# Patient Record
Sex: MALE | Race: WHITE | NOT HISPANIC OR LATINO | Employment: FULL TIME | URBAN - METROPOLITAN AREA
[De-identification: names, ages, dates, MRNs, and addresses within clinical notes are randomized per-mention and may not be internally consistent; named-entity substitution may affect disease eponyms.]

---

## 2021-04-08 DIAGNOSIS — Z23 ENCOUNTER FOR IMMUNIZATION: ICD-10-CM

## 2021-04-14 ENCOUNTER — IMMUNIZATIONS (OUTPATIENT)
Dept: FAMILY MEDICINE CLINIC | Facility: HOSPITAL | Age: 51
End: 2021-04-14

## 2021-04-14 DIAGNOSIS — Z23 ENCOUNTER FOR IMMUNIZATION: Primary | ICD-10-CM

## 2021-04-14 PROCEDURE — 91300 SARS-COV-2 / COVID-19 MRNA VACCINE (PFIZER-BIONTECH) 30 MCG: CPT

## 2021-04-14 PROCEDURE — 0001A SARS-COV-2 / COVID-19 MRNA VACCINE (PFIZER-BIONTECH) 30 MCG: CPT

## 2021-05-07 ENCOUNTER — IMMUNIZATIONS (OUTPATIENT)
Dept: FAMILY MEDICINE CLINIC | Facility: HOSPITAL | Age: 51
End: 2021-05-07

## 2021-05-07 DIAGNOSIS — Z23 ENCOUNTER FOR IMMUNIZATION: Primary | ICD-10-CM

## 2021-05-07 PROCEDURE — 0002A SARS-COV-2 / COVID-19 MRNA VACCINE (PFIZER-BIONTECH) 30 MCG: CPT

## 2021-05-07 PROCEDURE — 91300 SARS-COV-2 / COVID-19 MRNA VACCINE (PFIZER-BIONTECH) 30 MCG: CPT

## 2022-02-24 ENCOUNTER — APPOINTMENT (OUTPATIENT)
Dept: RADIOLOGY | Facility: CLINIC | Age: 52
End: 2022-02-24
Payer: COMMERCIAL

## 2022-02-24 ENCOUNTER — OFFICE VISIT (OUTPATIENT)
Dept: URGENT CARE | Facility: CLINIC | Age: 52
End: 2022-02-24
Payer: COMMERCIAL

## 2022-02-24 VITALS — HEART RATE: 94 BPM | RESPIRATION RATE: 14 BRPM | OXYGEN SATURATION: 100 % | TEMPERATURE: 97.3 F

## 2022-02-24 DIAGNOSIS — S89.92XA INJURY, KNEE, LEFT, INITIAL ENCOUNTER: ICD-10-CM

## 2022-02-24 DIAGNOSIS — S89.92XA INJURY, KNEE, LEFT, INITIAL ENCOUNTER: Primary | ICD-10-CM

## 2022-02-24 PROCEDURE — 73564 X-RAY EXAM KNEE 4 OR MORE: CPT

## 2022-02-24 PROCEDURE — 99214 OFFICE O/P EST MOD 30 MIN: CPT | Performed by: PHYSICIAN ASSISTANT

## 2022-02-24 RX ORDER — PREDNISONE 10 MG/1
40 TABLET ORAL DAILY
Qty: 20 TABLET | Refills: 0 | Status: SHIPPED | OUTPATIENT
Start: 2022-02-24 | End: 2022-03-01

## 2022-02-24 RX ORDER — NAPROXEN 500 MG/1
500 TABLET ORAL 2 TIMES DAILY WITH MEALS
Qty: 60 TABLET | Refills: 0 | Status: SHIPPED | OUTPATIENT
Start: 2022-02-24 | End: 2022-03-26

## 2022-02-24 NOTE — LETTER
February 24, 2022     Patient: Jaquelin Irizarry   YOB: 1970   Date of Visit: 2/24/2022       To Whom it May Concern:    Jaquelin Irizarry is under my professional care  He was seen in my office on 2/24/2022  He may return to work on 2/28/22  If you have any questions or concerns, please don't hesitate to call           Sincerely,          Luis Black PA-C        CC: No Recipients

## 2022-02-24 NOTE — PROGRESS NOTES
[address] [facility] [facility]        NAME: Lola Vera is a 46 y o  male  : 1970    MRN: [id number]  DATE: 2022  TIME: 1:40 PM    Assessment and Plan   Injury, knee, left, initial encounter [S89 [id number]]  1  Injury, knee, left, initial encounter  XR knee 4+ vw left injury    predniSONE 10 mg tablet    Diclofenac Sodium (VOLTAREN) 1 %    naproxen (EC NAPROSYN) 500 MG EC tablet    Ambulatory referral to Orthopedic Surgery         Patient Instructions   Patient Instructions     Take the steroid and apply the Volatren Gel -  You may get this over the counter if it is cheaper   After the steroid you may take naproxen twice a day  For now use tylenol for pain  Keep it elevated, ice it, keep it compressed and keep off of it     If the swelling does not go down in 24-48 hours follow-up with orthopedics  If you develop a fever or your joint turns red go to the emergency room  Possible bursitis or joint effusion   Lower on the list of possibilities if a recurrence of your gout       Knee Bursitis   AMBULATORY CARE:   Knee bursitis  is inflammation of the bursa in your knee  The bursa is a fluid-filled sac that acts as a cushion between a bone and a tendon  A tendon is a cord of strong tissue that connects muscles to bones  Common signs and symptoms of knee bursitis:   · Pain, swelling, or tenderness in your knee    · Decreased movement or stiffness of your knee    · Red, warm, skin over your knee    · A grating or grinding sound or feeling when you move your knee    Call your doctor if:   · Your pain and swelling increase  · Your symptoms do not improve with treatment  · You have a fever  · You have questions or concerns about your condition or care  Treatment  may include any of the following:  · Medicines:      ? NSAIDs , such as ibuprofen, help decrease swelling, pain, and fever  NSAIDs can cause stomach bleeding or kidney problems in certain people   If you take blood thinner medicine, always ask your healthcare provider if NSAIDs are safe for you  Always read the medicine label and follow directions  ? Aspirin  helps relieve pain and swelling  Take aspirin exactly as directed by your healthcare provider  ? Antibiotics  help fight an infection caused by bacteria  ? Steroids  help relieve pain and swelling  Steroid injections are given directly into the painful area  Steroid pills may be given for a short time  · Surgery  may be used to remove your bursa  Surgery is only done when other treatments do not work  Manage your symptoms:   · Rest your knee as much as possible to decrease pain and swelling  Slowly start to do more each day  Return to your daily activities as directed  · Apply ice to help decrease swelling and pain  Ice may also help prevent tissue damage  Use an ice pack, or put crushed ice in a plastic bag  Cover it with a towel and place it on your knee for 15 to 20 minutes, 3 to 4 times each day, as directed  · Apply heat to help decrease pain and stiffness  Apply heat on the area for 15 to 20 minutes, 3 to 4 times each day, as directed  · Apply compression to decrease swelling  Healthcare providers may wrap your knee with tape or an elastic bandage to decrease swelling  Loosen the elastic bandage if you start to lose feeling in your toes  · Elevate  your knee above the level of your heart as often as you can  This will help decrease swelling and pain  Prop your lower leg on pillows or blankets to keep it elevated comfortably  Do not put the pillow directly under your knee  · Go to physical therapy, if directed  A physical therapist can teach you exercises to improve your range of motion and increase knee strength  Prevent knee bursitis:   · Stretch, warm up, and cool down when you exercise  This will help loosen your muscles and decrease stress on your knees  Rest between workouts  · Protect your knees    Use kneepads when you kneel on a hard surface and when you play sports  Stand and walk around every 20 minutes if you have to kneel for a long period of time  Follow up with your doctor as directed:  Write down your questions so you remember to ask them during your visits  © Copyright [employer] 2021 Information is for End User's use only and may not be sold, redistributed or otherwise used for commercial purposes  All illustrations and images included in CareNotes® are the copyrighted property of [employer] D A M [employer] Inc  or [facility] [employer]   The above information is an  only  It is not intended as medical advice for individual conditions or treatments  Talk to your doctor, nurse or pharmacist before following any medical regimen to see if it is safe and effective for you  Follow up with PCP in 3-5 days  Proceed to  ER if symptoms worsen  Chief Complaint     Chief Complaint   Patient presents with    Injury     pt presents with injury of the left knee 2-3 days ago          History of Present Illness       The patient is a 49-year-old male presenting with left knee pain x3 days  He denies any injury  He now is unable to walk or bend the knee  He believes he jammed the knee while at work  The patient does have a history of gout but this does not feel like prior episodes  He has not recently been eating red meats or drinking alcohol  He reports noticing slight pain 3 this that swelling developed  He is having pain with walking  Review of Systems   Review of Systems   Constitutional: Negative for chills and fever  HENT: Negative for ear pain and sore throat  Eyes: Negative for pain and visual disturbance  Respiratory: Negative for cough and shortness of breath  Cardiovascular: Negative for chest pain and palpitations  Gastrointestinal: Negative for abdominal pain and vomiting  Genitourinary: Negative for dysuria and hematuria  Musculoskeletal: Positive for arthralgias and joint swelling  Negative for back pain  Skin: Negative for color change and rash  Neurological: Negative for seizures and syncope  All other systems reviewed and are negative  Current Medications       Current Outpatient Medications:     Diclofenac Sodium (VOLTAREN) 1 %, Apply 2 g topically 4 (four) times a day, Disp: 120 g, Rfl: 0    naproxen (EC NAPROSYN) 500 MG EC tablet, Take 1 tablet (500 mg total) by mouth 2 (two) times a day with meals, Disp: 60 tablet, Rfl: 0    predniSONE 10 mg tablet, Take 4 tablets (40 mg total) by mouth daily for 5 days, Disp: 20 tablet, Rfl: 0    Current Allergies     Allergies as of 02/24/2022    (No Known Allergies)            The following portions of the patient's history were reviewed and updated as appropriate: allergies, current medications, past family history, past medical history, past social history, past surgical history and problem list      Past Medical History:   Diagnosis Date    Patient denies medical problems        Past Surgical History:   Procedure Laterality Date    EYE SURGERY         History reviewed  No pertinent family history  Medications have been verified  Objective   Pulse 94   Temp (!) 97 3 °F (36 3 °C)   Resp 14   SpO2 100%        Physical Exam     Physical Exam  Vitals and nursing note reviewed  Constitutional:       General: He is not in acute distress  Appearance: Normal appearance  He is normal weight  He is not ill-appearing, toxic-appearing or diaphoretic  HENT:      Head: Normocephalic and atraumatic  Cardiovascular:      Rate and Rhythm: Normal rate and regular rhythm  Heart sounds: Normal heart sounds  No murmur heard  No friction rub  No gallop  Pulmonary:      Effort: Pulmonary effort is normal  No respiratory distress  Breath sounds: Normal breath sounds  No stridor  No wheezing, rhonchi or rales  Chest:      Chest wall: No tenderness  Abdominal:      General: Abdomen is flat   Bowel sounds are normal  There is no distension  Palpations: Abdomen is soft  Tenderness: There is no abdominal tenderness  There is no guarding  Musculoskeletal:         General: Swelling and tenderness present  No deformity or signs of injury  Normal range of motion  Cervical back: Normal range of motion  Comments: Mild tenderness to deep palpation of the left knee  The left knee is swollen above the patella  There appears to be a joint effusion  The patient does have full range of motion  The joint is slightly warm to the touch  Lymphadenopathy:      Cervical: No cervical adenopathy  Skin:     General: Skin is warm and dry  Capillary Refill: Capillary refill takes less than 2 seconds  Neurological:      Mental Status: He is alert

## 2022-02-24 NOTE — PATIENT INSTRUCTIONS
Take the steroid and apply the Volatren Gel -  You may get this over the counter if it is cheaper   After the steroid you may take naproxen twice a day  For now use tylenol for pain  Keep it elevated, ice it, keep it compressed and keep off of it     If the swelling does not go down in 24-48 hours follow-up with orthopedics  If you develop a fever or your joint turns red go to the emergency room  Possible bursitis or joint effusion   Lower on the list of possibilities if a recurrence of your gout       Knee Bursitis   AMBULATORY CARE:   Knee bursitis  is inflammation of the bursa in your knee  The bursa is a fluid-filled sac that acts as a cushion between a bone and a tendon  A tendon is a cord of strong tissue that connects muscles to bones  Common signs and symptoms of knee bursitis:   · Pain, swelling, or tenderness in your knee    · Decreased movement or stiffness of your knee    · Red, warm, skin over your knee    · A grating or grinding sound or feeling when you move your knee    Call your doctor if:   · Your pain and swelling increase  · Your symptoms do not improve with treatment  · You have a fever  · You have questions or concerns about your condition or care  Treatment  may include any of the following:  · Medicines:      ? NSAIDs , such as ibuprofen, help decrease swelling, pain, and fever  NSAIDs can cause stomach bleeding or kidney problems in certain people  If you take blood thinner medicine, always ask your healthcare provider if NSAIDs are safe for you  Always read the medicine label and follow directions  ? Aspirin  helps relieve pain and swelling  Take aspirin exactly as directed by your healthcare provider  ? Antibiotics  help fight an infection caused by bacteria  ? Steroids  help relieve pain and swelling  Steroid injections are given directly into the painful area  Steroid pills may be given for a short time  · Surgery  may be used to remove your bursa  Surgery is only done when other treatments do not work  Manage your symptoms:   · Rest your knee as much as possible to decrease pain and swelling  Slowly start to do more each day  Return to your daily activities as directed  · Apply ice to help decrease swelling and pain  Ice may also help prevent tissue damage  Use an ice pack, or put crushed ice in a plastic bag  Cover it with a towel and place it on your knee for 15 to 20 minutes, 3 to 4 times each day, as directed  · Apply heat to help decrease pain and stiffness  Apply heat on the area for 15 to 20 minutes, 3 to 4 times each day, as directed  · Apply compression to decrease swelling  Healthcare providers may wrap your knee with tape or an elastic bandage to decrease swelling  Loosen the elastic bandage if you start to lose feeling in your toes  · Elevate  your knee above the level of your heart as often as you can  This will help decrease swelling and pain  Prop your lower leg on pillows or blankets to keep it elevated comfortably  Do not put the pillow directly under your knee  · Go to physical therapy, if directed  A physical therapist can teach you exercises to improve your range of motion and increase knee strength  Prevent knee bursitis:   · Stretch, warm up, and cool down when you exercise  This will help loosen your muscles and decrease stress on your knees  Rest between workouts  · Protect your knees  Use kneepads when you kneel on a hard surface and when you play sports  Stand and walk around every 20 minutes if you have to kneel for a long period of time  Follow up with your doctor as directed:  Write down your questions so you remember to ask them during your visits  © Copyright [employer] 2021 Information is for End User's use only and may not be sold, redistributed or otherwise used for commercial purposes   All illustrations and images included in CareNotes® are the copyrighted property of [employer]  or [facility]  The above information is an  only  It is not intended as medical advice for individual conditions or treatments  Talk to your doctor, nurse or pharmacist before following any medical regimen to see if it is safe and effective for you

## 2023-03-23 ENCOUNTER — HOSPITAL ENCOUNTER (EMERGENCY)
Facility: HOSPITAL | Age: 53
Discharge: HOME | End: 2023-03-23
Attending: EMERGENCY MEDICINE

## 2023-03-23 ENCOUNTER — APPOINTMENT (EMERGENCY)
Dept: RADIOLOGY | Facility: HOSPITAL | Age: 53
End: 2023-03-23

## 2023-03-23 ENCOUNTER — OFFICE VISIT (OUTPATIENT)
Dept: URGENT CARE | Facility: CLINIC | Age: 53
End: 2023-03-23

## 2023-03-23 VITALS
TEMPERATURE: 97.8 F | WEIGHT: 210 LBS | SYSTOLIC BLOOD PRESSURE: 191 MMHG | RESPIRATION RATE: 18 BRPM | BODY MASS INDEX: 27.71 KG/M2 | DIASTOLIC BLOOD PRESSURE: 93 MMHG | HEART RATE: 79 BPM | OXYGEN SATURATION: 95 %

## 2023-03-23 VITALS
RESPIRATION RATE: 18 BRPM | WEIGHT: 210 LBS | HEIGHT: 73 IN | HEART RATE: 76 BPM | OXYGEN SATURATION: 98 % | BODY MASS INDEX: 27.83 KG/M2 | TEMPERATURE: 97.1 F

## 2023-03-23 DIAGNOSIS — M25.561 PAIN AND SWELLING OF RIGHT KNEE: Primary | ICD-10-CM

## 2023-03-23 DIAGNOSIS — M79.661 RIGHT CALF PAIN: Primary | ICD-10-CM

## 2023-03-23 DIAGNOSIS — M25.461 PAIN AND SWELLING OF RIGHT KNEE: Primary | ICD-10-CM

## 2023-03-23 LAB
APPEARANCE FLD: CLEAR
COLOR FLD: ABNORMAL
CRYSTALS SNV QL MICRO: NORMAL
MONOCYTES NFR SNV MANUAL: 19 %
NEUTROPHILS NFR SNV MANUAL: 81 %
RBC # SNV MANUAL: 0 /UL (ref 0–10)
SITE: ABNORMAL
TOTAL CELLS COUNTED SPEC: 100
WBC # FLD MANUAL: 949 /UL (ref 0–200)

## 2023-03-23 RX ORDER — IBUPROFEN 400 MG/1
400 TABLET ORAL ONCE
Status: COMPLETED | OUTPATIENT
Start: 2023-03-23 | End: 2023-03-23

## 2023-03-23 RX ORDER — NAPROXEN 500 MG/1
500 TABLET ORAL 2 TIMES DAILY WITH MEALS
Qty: 14 TABLET | Refills: 0 | Status: SHIPPED | OUTPATIENT
Start: 2023-03-23 | End: 2023-03-30

## 2023-03-23 RX ORDER — GINSENG 100 MG
1 CAPSULE ORAL ONCE
Status: COMPLETED | OUTPATIENT
Start: 2023-03-23 | End: 2023-03-23

## 2023-03-23 RX ORDER — LIDOCAINE HYDROCHLORIDE AND EPINEPHRINE 10; 10 MG/ML; UG/ML
10 INJECTION, SOLUTION INFILTRATION; PERINEURAL ONCE
Status: COMPLETED | OUTPATIENT
Start: 2023-03-23 | End: 2023-03-23

## 2023-03-23 RX ADMIN — LIDOCAINE HYDROCHLORIDE,EPINEPHRINE BITARTRATE 10 ML: 10; .01 INJECTION, SOLUTION INFILTRATION; PERINEURAL at 14:11

## 2023-03-23 RX ADMIN — BACITRACIN 1 SMALL APPLICATION: 500 OINTMENT TOPICAL at 14:11

## 2023-03-23 RX ADMIN — IBUPROFEN 400 MG: 400 TABLET ORAL at 13:21

## 2023-03-23 NOTE — PROGRESS NOTES
[address] [facility]        NAME: Sebastian Carreon is a 46 y o  male  : 1970    MRN: [id number]  DATE: 2023  TIME: 12:34 PM    Assessment and Plan   Right calf pain [M79 [id number]]  1  Right calf pain  Transfer to other facility        Concern for ruptured Baker's cyst but cannot ro DVT  Recommend ED for Doppler  Pt agreeable and will drive self to [facility] now  Patient Instructions       Follow up with PCP in 3-5 days  Proceed to  ER if symptoms worsen  Chief Complaint     Chief Complaint   Patient presents with   • Knee Pain     Rt knee pain without injury had swollen knee with pain posteriorly          History of Present Illness       Pt is a 45 yo male with no reported pmh pw atraumatic R lower leg pain x 1 day  Hurts to bend it at all and significantly more swollen than left leg  Is a  and heavy smoker  Denies personal or family history of blood clots  No sob, chest pain  Review of Systems   Review of Systems   Constitutional: Negative for chills and fever  Respiratory: Negative for shortness of breath  Cardiovascular: Positive for leg swelling  Negative for chest pain  Gastrointestinal: Negative for nausea and vomiting  Musculoskeletal: Negative for back pain, gait problem, joint swelling, myalgias and neck pain  Skin: Negative for color change and wound  Neurological: Negative for weakness and numbness           Current Medications       Current Outpatient Medications:   •  Diclofenac Sodium (VOLTAREN) 1 %, Apply 2 g topically 4 (four) times a day (Patient not taking: Reported on 3/23/2023), Disp: 120 g, Rfl: 0  •  naproxen (EC NAPROSYN) 500 MG EC tablet, Take 1 tablet (500 mg total) by mouth 2 (two) times a day with meals, Disp: 60 tablet, Rfl: 0    Current Allergies     Allergies as of 2023   • (No Known Allergies)            The following portions of the patient's history were reviewed and updated as appropriate: allergies, current medications, past family history, past medical history, past social history, past surgical history and problem list      Past Medical History:   Diagnosis Date   • Patient denies medical problems        Past Surgical History:   Procedure Laterality Date   • EYE SURGERY         No family history on file  Medications have been verified  Objective   Pulse 76   Temp (!) 97 1 °F (36 2 °C)   Resp 18   Ht 6' 1" (1 854 m)   Wt 95 3 kg (210 lb)   SpO2 98%   BMI 27 71 kg/m²        Physical Exam     Physical Exam  Vitals and nursing note reviewed  Constitutional:       General: He is not in acute distress  Appearance: Normal appearance  He is not ill-appearing  HENT:      Head: Normocephalic and atraumatic  Cardiovascular:      Rate and Rhythm: Normal rate  Pulmonary:      Effort: Pulmonary effort is normal  No respiratory distress  Musculoskeletal:      Right lower leg: Swelling (5 cm larger circumference compared to left  Pale ) and tenderness (positive Yamil's sign) present  Edema present  Skin:     General: Skin is warm and dry  Capillary Refill: Capillary refill takes less than 2 seconds  Neurological:      Mental Status: He is alert and oriented to person, place, and time     Psychiatric:         Behavior: Behavior normal

## 2023-03-23 NOTE — ED PROVIDER NOTES
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 Taking? Diclofenac Sodium (VOLTAREN) 1 %   No No   Sig: Apply 2 g topically 4 (four) times a day   Patient not taking: Reported on 3/23/2023   naproxen (EC NAPROSYN) 500 MG EC tablet   No No   Sig: Take 1 tablet (500 mg total) by mouth 2 (two) times a day with meals      Facility-Administered Medications: None       Past Medical History:   Diagnosis Date   • Patient denies medical problems        Past Surgical History:   Procedure Laterality Date   • EYE SURGERY         History reviewed  No pertinent family history  I have reviewed and agree with the history as documented  E-Cigarette/Vaping   • E-Cigarette Use Never User      E-Cigarette/Vaping Substances     Social History     Tobacco Use   • Smoking status: Every Day   • Smokeless tobacco: Never   Vaping Use   • Vaping Use: Never used   Substance Use Topics   • Alcohol use: Yes     Comment: occ   • Drug use: Never       Review of Systems   Constitutional: Negative for chills and fever  HENT: Negative for ear pain and sore throat  Respiratory: Negative for cough and shortness of breath  Cardiovascular: Negative for chest pain and palpitations  Gastrointestinal: Negative for abdominal pain and vomiting  Genitourinary: Negative for hematuria     Musculoskeletal: Positive for arthralgias and joint swelling  Negative for back pain and neck pain  Skin: Negative for color change and rash  Neurological: Negative for syncope  All other systems reviewed and are negative  Physical Exam  Physical Exam  Vitals and nursing note reviewed  Constitutional:       General: He is not in acute distress  Appearance: Normal appearance  He is not ill-appearing, toxic-appearing or diaphoretic  HENT:      Head: Normocephalic and atraumatic  Right Ear: Tympanic membrane, ear canal and external ear normal       Left Ear: Tympanic membrane, ear canal and external ear normal       Nose: Nose normal       Mouth/Throat:      Mouth: Mucous membranes are moist       Pharynx: Oropharynx is clear  Eyes:      Extraocular Movements: Extraocular movements intact  Conjunctiva/sclera: Conjunctivae normal       Pupils: Pupils are equal, round, and reactive to light  Cardiovascular:      Rate and Rhythm: Normal rate  Pulses: Normal pulses  Heart sounds: Normal heart sounds  Pulmonary:      Effort: Pulmonary effort is normal       Breath sounds: Normal breath sounds  Abdominal:      General: Abdomen is flat  Bowel sounds are normal       Palpations: Abdomen is soft  Musculoskeletal:      Cervical back: Normal range of motion and neck supple  Comments: Right knee swelling  No right knee erythema or warmth  Pain in right knee with range of motion  Mild right proximal calf tenderness  Right leg is neurovascular intact  Skin:     General: Skin is warm and dry  Capillary Refill: Capillary refill takes less than 2 seconds  Neurological:      General: No focal deficit present  Mental Status: He is alert and oriented to person, place, and time  Mental status is at baseline           Vital Signs  ED Triage Vitals [03/23/23 1224]   Temperature Pulse Respirations Blood Pressure SpO2   97 8 °F (36 6 °C) 87 18 (!) 189/110 98 %      Temp Source Heart Rate Source Patient Position - Orthostatic VS BP Location FiO2 (%)   Tympanic Monitor Lying Left arm --      Pain Score       8           Vitals:    03/23/23 1224 03/23/23 1452   BP: (!) 189/110 (!) 191/93   Pulse: 87 79   Patient Position - Orthostatic VS: Lying Sitting         Visual Acuity      ED Medications  Medications   ibuprofen (MOTRIN) tablet 400 mg (400 mg Oral Given 3/23/23 1321)   lidocaine-epinephrine (XYLOCAINE/EPINEPHRINE) 1 %-1:100,000 injection 10 mL (10 mL Infiltration Given 3/23/23 1411)   bacitracin topical ointment 1 small application (1 small application Topical Given 3/23/23 1411)       Diagnostic Studies  Results Reviewed     Procedure Component Value Units Date/Time    Synovial fluid, Culture and Gram stain [[id number]] Collected: 03/23/23 1453    Lab Status: Preliminary result Specimen:  Body Fluid from Joint, Right Knee Updated: 03/24/23 0341     Gram Stain Result No Polys or Bacteria seen    Synovial fluid, crystal [[id number]] Collected: 03/23/23 1453    Lab Status: Final result Specimen: Synovial Fluid from Joint, Right Knee Updated: 03/23/23 2030     Crystals, Synovial Fluid No Crystals Seen    Synovial Fluid Diff [[id number]] Collected: 03/23/23 1453    Lab Status: Final result Specimen: Synovial Fluid from Joint, Right Knee Updated: 03/23/23 1604     Total Counted 100     Neutrophil % Synovial 81 %      Monocyte % Synovial 19 %     Synovial fluid, RBC count [[id number]]  (Normal) Collected: 03/23/23 1453    Lab Status: Final result Specimen: Synovial Fluid from Joint, Right Knee Updated: 03/23/23 1526     RBC, SYNOVIAL 0    Synovial fluid, white cell count w/ diff [[id number]]  (Abnormal) Collected: 03/23/23 1453    Lab Status: Final result Specimen: Synovial Fluid from Joint, Right Knee Updated: 03/23/23 1526     Site Synovial, Right Knee     Color, Fluid Light Yellow     Clarity, Fluid Clear     WBC, Fluid 949 /ul                  VAS lower limb venous duplex study, unilateral/limited   Final Result by Roberto Dye MD (03/23 2129)      XR knee 4+ views Right injury   Final Result by Sohail Rodriguez MD (03/24 0692)      No acute osseous abnormality  Workstation performed: [id number]                    Procedures  Arthrocentesis    Date/Time: 3/23/2023 3:17 PM  Performed by: See Kamara PA-C  Authorized by: See Kamara PA-C     Location:  Bedside  Verbal consent obtained?: Yes    Risks and benefits: Risks, benefits and alternatives were discussed    Consent given by:  Patient  Patient states understanding of procedure being performed: Yes    Patient's understanding of procedure matches consent: Yes    Procedure consent matches procedure scheduled: Yes    Relevant documents present and verified: Yes    Test results available and properly labeled: Yes    Site marked: Yes    Radiology Images displayed and confirmed  If images not available, report reviewed: Yes    Patient identity confirmed:  Verbally with patient and arm band  Time out: Immediately prior to the procedure a time out was called    Indications:  Joint swelling, diagnostic evaluation, therapeutic, pain and possible septic joint  Local anesthesia used?: Yes    Anesthesia:  Local infiltration  Local anesthetic:  Lidocaine 1% with epinephrine  Preparation: Patient was prepped and draped in usual sterile fashion    Needle size:  18 G  Ultrasound guidance: No    Approach:  Lateral  Aspirate amount (ml):  45  Aspirate:  Yellow  Patient tolerance:  Patient tolerated the procedure well with no immediate complications             ED Course                                             Medical Decision Making  3year-old white male with atraumatic right knee swelling and pain rating to the right calf beginning yesterday  I ordered venous Doppler ultrasound  There is no DVT or Baker's cyst noted  I ordered x-ray of knee and interpreted as no acute osseous abnormality    Right knee arthrocentesis was performed under sterile conditions after verbal consent by patient  No crystals noted  Synovial fluid white count 949  Gram stain shows no organisms or polys  Await C&S  Ace wrap was placed  Patient had crutches to assist ambulation  He was advised to follow-up with orthopedist if no improvement next 2 to 3 days  Return precautions given including fever, knee redness or warmth, worsening symptoms    Amount and/or Complexity of Data Reviewed  Labs: ordered  Radiology: ordered  Risk  OTC drugs  Prescription drug management  Disposition  Final diagnoses:   Pain and swelling of right knee     Time reflects when diagnosis was documented in both MDM as applicable and the Disposition within this note     Time User Action Codes Description Comment    3/23/2023  4:20 PM Sarina Debbie Add [O48 148,  M25 [id number]] Pain and swelling of right knee       ED Disposition     ED Disposition   Discharge    Condition   Stable    Date/Time   Thu Mar 23, 2023  4:20 PM    Comment   Gaby Eddy discharge to home/self care                 Follow-up Information     Follow up With Specialties Details Why Contact Info Additional Information    Belkis Motta MD [place] [id number]  [phone]       [address] [place] [address] Orthopedic Surgery   [address] [place], [place] [id number] [phone] [address] [place] [address] [address] [place] [address], [address] [place] [address]          Discharge Medication List as of 3/23/2023  4:22 PM      START taking these medications    Details   naproxen (Naprosyn) 500 mg tablet Take 1 tablet (500 mg total) by mouth 2 (two) times a day with meals for 7 days, Starting Thu 3/23/2023, Until Thu 3/30/2023, Normal         CONTINUE these medications which have NOT CHANGED    Details   Diclofenac Sodium (VOLTAREN) 1 % Apply 2 g topically 4 (four) times a day, Starting Thu 2/24/2022, Normal      naproxen (EC NAPROSYN) 500 MG EC tablet Take 1 tablet (500 mg total) by mouth 2 (two) times a day with meals, Starting Thu 2/24/2022, Until Sat 3/26/2022, Normal             No discharge procedures on file      PDMP Review     None          ED Provider  Electronically Signed by           Sharifa Espinosa PA-C  03/24/23 7118

## 2023-03-23 NOTE — DISCHARGE INSTRUCTIONS
Naprosyn twice daily with food as needed for pain  Intermittent ice and elevation advised  Follow-up with orthopedist of your choice or with [facility] orthopedic group   Call for appointment    Return to ED for fever, R knee redness/warmth, worsening pain or symptoms

## 2023-03-23 NOTE — ED NOTES
Synovial fluid physically walked to lab and handed to         [facility]  03/23/23 2111

## 2023-03-26 LAB
BACTERIA SPEC BFLD CULT: NO GROWTH
GRAM STN SPEC: NORMAL

## 2025-08-01 ENCOUNTER — HOSPITAL ENCOUNTER (EMERGENCY)
Facility: HOSPITAL | Age: 55
Discharge: HOME | End: 2025-08-01
Payer: COMMERCIAL

## 2025-08-01 ENCOUNTER — OFFICE VISIT (OUTPATIENT)
Dept: URGENT CARE | Facility: CLINIC | Age: 55
End: 2025-08-01
Payer: COMMERCIAL

## 2025-08-01 VITALS
TEMPERATURE: 98.4 F | RESPIRATION RATE: 18 BRPM | SYSTOLIC BLOOD PRESSURE: 185 MMHG | DIASTOLIC BLOOD PRESSURE: 121 MMHG | OXYGEN SATURATION: 95 % | HEART RATE: 84 BPM

## 2025-08-01 VITALS
SYSTOLIC BLOOD PRESSURE: 170 MMHG | OXYGEN SATURATION: 96 % | TEMPERATURE: 98.6 F | HEART RATE: 88 BPM | WEIGHT: 253 LBS | BODY MASS INDEX: 33.38 KG/M2 | RESPIRATION RATE: 20 BRPM | DIASTOLIC BLOOD PRESSURE: 86 MMHG

## 2025-08-01 DIAGNOSIS — M25.562 LEFT KNEE PAIN: Primary | ICD-10-CM

## 2025-08-01 DIAGNOSIS — M25.462 PAIN AND SWELLING OF KNEE, LEFT: Primary | ICD-10-CM

## 2025-08-01 DIAGNOSIS — M25.562 PAIN AND SWELLING OF KNEE, LEFT: Primary | ICD-10-CM

## 2025-08-01 DIAGNOSIS — R03.0 ELEVATED BLOOD PRESSURE READING: ICD-10-CM

## 2025-08-01 PROCEDURE — 99282 EMERGENCY DEPT VISIT SF MDM: CPT

## 2025-08-01 PROCEDURE — 99213 OFFICE O/P EST LOW 20 MIN: CPT

## 2025-08-01 PROCEDURE — 96372 THER/PROPH/DIAG INJ SC/IM: CPT

## 2025-08-01 RX ORDER — HYDROCHLOROTHIAZIDE 25 MG/1
12.5 TABLET ORAL DAILY
Qty: 7 TABLET | Refills: 0 | Status: SHIPPED | OUTPATIENT
Start: 2025-08-01 | End: 2025-08-15

## 2025-08-01 RX ORDER — KETOROLAC TROMETHAMINE 30 MG/ML
15 INJECTION, SOLUTION INTRAMUSCULAR; INTRAVENOUS ONCE
Status: COMPLETED | OUTPATIENT
Start: 2025-08-01 | End: 2025-08-01

## 2025-08-01 RX ORDER — ACETAMINOPHEN 325 MG/1
975 TABLET ORAL ONCE
Status: COMPLETED | OUTPATIENT
Start: 2025-08-01 | End: 2025-08-01

## 2025-08-01 RX ADMIN — ACETAMINOPHEN 975 MG: 325 TABLET ORAL at 16:42

## 2025-08-01 RX ADMIN — KETOROLAC TROMETHAMINE 15 MG: 30 INJECTION, SOLUTION INTRAMUSCULAR at 16:43

## 2025-08-04 ENCOUNTER — APPOINTMENT (OUTPATIENT)
Dept: RADIOLOGY | Facility: CLINIC | Age: 55
End: 2025-08-04
Attending: ORTHOPAEDIC SURGERY
Payer: COMMERCIAL

## 2025-08-04 VITALS — WEIGHT: 253 LBS | BODY MASS INDEX: 33.53 KG/M2 | HEIGHT: 73 IN

## 2025-08-04 DIAGNOSIS — Z01.89 ENCOUNTER FOR LOWER EXTREMITY COMPARISON IMAGING STUDY: ICD-10-CM

## 2025-08-04 DIAGNOSIS — M25.462 EFFUSION OF LEFT KNEE: Primary | ICD-10-CM

## 2025-08-04 DIAGNOSIS — M17.12 PRIMARY OSTEOARTHRITIS OF LEFT KNEE: ICD-10-CM

## 2025-08-04 DIAGNOSIS — M25.562 LEFT KNEE PAIN: ICD-10-CM

## 2025-08-04 LAB
CRYSTALS SNV QL MICRO: NORMAL
LYMPHOCYTES # SNV MANUAL: 11 %
MONOCYTES NFR SNV MANUAL: 15 %
NEUTROPHILS NFR SNV MANUAL: 63 %
NEUTS BAND NFR SNV: 11 %
SITE: ABNORMAL
TOTAL CELLS COUNTED SPEC: 100
WBC # FLD MANUAL: 8330 /UL (ref 0–200)

## 2025-08-04 PROCEDURE — 20611 DRAIN/INJ JOINT/BURSA W/US: CPT | Performed by: ORTHOPAEDIC SURGERY

## 2025-08-04 PROCEDURE — 87205 SMEAR GRAM STAIN: CPT | Performed by: ORTHOPAEDIC SURGERY

## 2025-08-04 PROCEDURE — 87476 LYME DIS DNA AMP PROBE: CPT | Performed by: ORTHOPAEDIC SURGERY

## 2025-08-04 PROCEDURE — 89060 EXAM SYNOVIAL FLUID CRYSTALS: CPT | Performed by: ORTHOPAEDIC SURGERY

## 2025-08-04 PROCEDURE — 73562 X-RAY EXAM OF KNEE 3: CPT

## 2025-08-04 PROCEDURE — 73560 X-RAY EXAM OF KNEE 1 OR 2: CPT

## 2025-08-04 PROCEDURE — 87070 CULTURE OTHR SPECIMN AEROBIC: CPT | Performed by: ORTHOPAEDIC SURGERY

## 2025-08-04 PROCEDURE — 99203 OFFICE O/P NEW LOW 30 MIN: CPT | Performed by: ORTHOPAEDIC SURGERY

## 2025-08-04 PROCEDURE — 89051 BODY FLUID CELL COUNT: CPT | Performed by: ORTHOPAEDIC SURGERY

## 2025-08-04 RX ORDER — LIDOCAINE HYDROCHLORIDE 10 MG/ML
4 INJECTION, SOLUTION INFILTRATION; PERINEURAL
Status: COMPLETED | OUTPATIENT
Start: 2025-08-04 | End: 2025-08-04

## 2025-08-04 RX ORDER — DEXAMETHASONE SODIUM PHOSPHATE 10 MG/ML
40 INJECTION, SOLUTION INTRAMUSCULAR; INTRAVENOUS
Status: COMPLETED | OUTPATIENT
Start: 2025-08-04 | End: 2025-08-04

## 2025-08-04 RX ADMIN — LIDOCAINE HYDROCHLORIDE 4 ML: 10 INJECTION, SOLUTION INFILTRATION; PERINEURAL at 10:30

## 2025-08-04 RX ADMIN — DEXAMETHASONE SODIUM PHOSPHATE 40 MG: 10 INJECTION, SOLUTION INTRAMUSCULAR; INTRAVENOUS at 10:30

## 2025-08-07 ENCOUNTER — RESULTS FOLLOW-UP (OUTPATIENT)
Dept: OBGYN CLINIC | Facility: CLINIC | Age: 55
End: 2025-08-07

## 2025-08-07 LAB
B BURGDOR DNA SPEC QL NAA+PROBE: NEGATIVE
BACTERIA SPEC BFLD CULT: NO GROWTH
GRAM STN SPEC: NORMAL